# Patient Record
Sex: MALE | Race: OTHER | Employment: FULL TIME | URBAN - NONMETROPOLITAN AREA
[De-identification: names, ages, dates, MRNs, and addresses within clinical notes are randomized per-mention and may not be internally consistent; named-entity substitution may affect disease eponyms.]

---

## 2024-09-26 ENCOUNTER — HOSPITAL ENCOUNTER (EMERGENCY)
Age: 29
Discharge: HOME | End: 2024-09-26
Payer: COMMERCIAL

## 2024-09-26 VITALS
DIASTOLIC BLOOD PRESSURE: 96 MMHG | SYSTOLIC BLOOD PRESSURE: 133 MMHG | RESPIRATION RATE: 18 BRPM | HEART RATE: 67 BPM | TEMPERATURE: 97.5 F | OXYGEN SATURATION: 99 %

## 2024-09-26 DIAGNOSIS — S05.01XA ABRASION OF RIGHT CORNEA, INITIAL ENCOUNTER: ICD-10-CM

## 2024-09-26 DIAGNOSIS — T15.91XA FOREIGN BODY OF RIGHT EYE, INITIAL ENCOUNTER: Primary | ICD-10-CM

## 2024-09-26 PROCEDURE — 65205 REMOVE FOREIGN BODY FROM EYE: CPT | Performed by: NURSE PRACTITIONER

## 2024-09-26 PROCEDURE — 99203 OFFICE O/P NEW LOW 30 MIN: CPT

## 2024-09-26 RX ORDER — PROPARACAINE HYDROCHLORIDE 5 MG/ML
1 SOLUTION/ DROPS OPHTHALMIC ONCE
Status: DISCONTINUED | OUTPATIENT
Start: 2024-09-26 | End: 2024-09-26 | Stop reason: HOSPADM

## 2024-09-26 RX ORDER — GENTAMICIN SULFATE 3 MG/ML
1 SOLUTION/ DROPS OPHTHALMIC EVERY 4 HOURS
Qty: 5 ML | Refills: 0 | Status: SHIPPED | OUTPATIENT
Start: 2024-09-26 | End: 2024-10-06

## 2024-09-26 ASSESSMENT — ENCOUNTER SYMPTOMS
PHOTOPHOBIA: 1
EYE DISCHARGE: 0
EYE REDNESS: 1
NAUSEA: 0
FACIAL SWELLING: 0
EYE PAIN: 1

## 2024-09-26 ASSESSMENT — PAIN DESCRIPTION - PAIN TYPE: TYPE: ACUTE PAIN

## 2024-09-26 ASSESSMENT — PAIN - FUNCTIONAL ASSESSMENT
PAIN_FUNCTIONAL_ASSESSMENT: ACTIVITIES ARE NOT PREVENTED
PAIN_FUNCTIONAL_ASSESSMENT: 0-10

## 2024-09-26 ASSESSMENT — PAIN DESCRIPTION - ORIENTATION: ORIENTATION: RIGHT

## 2024-09-26 ASSESSMENT — PAIN DESCRIPTION - DESCRIPTORS: DESCRIPTORS: ACHING

## 2024-09-26 ASSESSMENT — PAIN DESCRIPTION - LOCATION: LOCATION: EYE

## 2024-09-26 ASSESSMENT — PAIN SCALES - GENERAL: PAINLEVEL_OUTOF10: 5

## 2024-09-26 NOTE — ED PROVIDER NOTES
[facility] URGENT CARE  Urgent Care Encounter       CHIEF COMPLAINT       Chief Complaint   Patient presents with    Eye Pain       Nurses Notes reviewed and I agree except as noted in the HPI.  HISTORY OF PRESENT ILLNESS   Cole Oh is a 28 y.o. male who presents with complaints of right eye pain and redness.  This is a new problem that started yesterday.  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  He does wear safety glasses and a air purifier system.  He denies any discharge.  Does admit to some photosensitivity.  Denies any blurred vision.    The history is provided by the patient.       REVIEW OF SYSTEMS     Review of Systems   HENT:  Negative for facial swelling.    Eyes:  Positive for photophobia, pain (right) and redness. Negative for discharge and visual disturbance.   Gastrointestinal:  Negative for nausea.   Neurological:  Negative for headaches.       PAST MEDICAL HISTORY   History reviewed. No pertinent past medical history.    SURGICALHISTORY     Patient  has no past surgical history on file.    CURRENT MEDICATIONS       Previous Medications    No medications on file       ALLERGIES     Patient is has No Known Allergies.    Patients   There is no immunization history on file for this patient.    FAMILY HISTORY     Patient's family history is not on file.    SOCIAL HISTORY     Patient  reports that he has never smoked. He has never used smokeless tobacco.    PHYSICAL EXAM     ED TRIAGE VITALS  BP: (!) 133/96, Temp: 97.5 °F (36.4 °C), Pulse: 67, Respirations: 18, SpO2: 99 %,There is no height or weight on file to calculate BMI.,No LMP for male patient.    Physical Exam  Vitals and nursing note reviewed.   Constitutional:       General: He is not in acute distress.  HENT:      Nose: Nose normal.   Eyes:      General:         Right eye: No discharge.      Extraocular Movements: Extraocular movements intact.      Conjunctiva/sclera:      Right  eye: No exudate or hemorrhage.     Pupils: Pupils are equal, round, and reactive to light.      Right eye: Corneal abrasion present.     Cardiovascular:      Rate and Rhythm: Normal rate and regular rhythm.   Pulmonary:      Effort: Pulmonary effort is normal.   Skin:     General: Skin is warm and dry.   Neurological:      Mental Status: He is alert and oriented to person, place, and time.         DIAGNOSTIC RESULTS     Labs:No results found for this visit on 09/26/24.    IMAGING:  None    EKG:  None    URGENT CARE COURSE:     Vitals:    09/26/24 1651   BP: (!) 133/96   Pulse: 67   Resp: 18   Temp: 97.5 °F (36.4 °C)   TempSrc: Oral   SpO2: 99%       Medications   proparacaine (ALCAINE) 0.5 % ophthalmic solution 1 drop (has no administration in time range)          PROCEDURES:  Foreign Body    Date/Time: 9/26/2024 5:27 PM    Performed by: Shaka Quintero APRN - CNP  Authorized by: Shaka Quintero APRN - CNP    Consent:     Consent obtained:  Verbal    Consent given by:  Patient    Risks, benefits, and alternatives were discussed: yes      Risks discussed:  Pain, worsening of condition, infection and incomplete removal    Alternatives discussed:  Referral  [place] protocol:     Procedure explained and questions answered to patient or proxy's satisfaction: yes      Patient identity confirmed:  Verbally with patient and arm band  Location:     Location: right eye.  Anesthesia:     Anesthesia method:  Topical application    Topical anesthesia: Alcain drops.  Procedure type:     Procedure complexity:  Simple  Procedure details:     Localization method:  Visualized    Removal mechanism: Q-tip.    Foreign bodies recovered:  1    Description:  Jamilah of sand    Intact foreign body removal: yes    Post-procedure details:     Neurovascular status: intact      Confirmation:  No additional foreign bodies on visualization    Procedure completion:  Tolerated well, no immediate complications      FINAL IMPRESSION      1.

## 2024-09-26 NOTE — ED TRIAGE NOTES
To room with c/o right eye redness and irritation since Monday. Was concerned he may have something in it.